# Patient Record
Sex: MALE | Race: WHITE | NOT HISPANIC OR LATINO
[De-identification: names, ages, dates, MRNs, and addresses within clinical notes are randomized per-mention and may not be internally consistent; named-entity substitution may affect disease eponyms.]

---

## 2019-12-28 ENCOUNTER — TRANSCRIPTION ENCOUNTER (OUTPATIENT)
Age: 24
End: 2019-12-28

## 2024-09-10 ENCOUNTER — NON-APPOINTMENT (OUTPATIENT)
Age: 29
End: 2024-09-10

## 2024-09-11 ENCOUNTER — NON-APPOINTMENT (OUTPATIENT)
Age: 29
End: 2024-09-11

## 2024-09-11 ENCOUNTER — APPOINTMENT (OUTPATIENT)
Dept: NEUROLOGY | Facility: CLINIC | Age: 29
End: 2024-09-11
Payer: COMMERCIAL

## 2024-09-11 VITALS
DIASTOLIC BLOOD PRESSURE: 83 MMHG | SYSTOLIC BLOOD PRESSURE: 117 MMHG | OXYGEN SATURATION: 96 % | HEART RATE: 56 BPM | TEMPERATURE: 96.7 F

## 2024-09-11 VITALS — HEIGHT: 70 IN | BODY MASS INDEX: 26.77 KG/M2 | WEIGHT: 187 LBS

## 2024-09-11 DIAGNOSIS — G43.E09 CHRONIC MIGRAINE WITH AURA, NOT INTRACTABLE, WITHOUT STATUS MIGRAINOSUS: ICD-10-CM

## 2024-09-11 PROCEDURE — 99204 OFFICE O/P NEW MOD 45 MIN: CPT

## 2024-09-11 RX ORDER — RIZATRIPTAN BENZOATE 10 MG/1
10 TABLET ORAL
Qty: 9 | Refills: 1 | Status: ACTIVE | COMMUNITY
Start: 2024-09-11 | End: 1900-01-01

## 2024-09-11 NOTE — DISCUSSION/SUMMARY
[FreeTextEntry1] : Impression: 1) Chronic migraine with visual aura - severe migraines every few months associated with bitemporal pain and hypersensitivities. Always preceded by visual aura with flashing lights  Plan: 1) Trial of Rizatriptan prn

## 2024-09-11 NOTE — HISTORY OF PRESENT ILLNESS
[FreeTextEntry1] : Oleg is a 29 year old male presenting with headaches.  Headache History: Onset and course: 3 years  Location: Bilateral temples Character: Sharp, splitting Severity: 10/10 Duration: 1-2 hours Hypersensitivity: Photophobia, phonophobia, nausea, vomiting  H/A days/month: Can go several months headache free, then will get several in a two week period Aura: Flashing lights for 10 minutes prior to pain  Autonomic symptoms: None  Alleviated by: Rest in dark room, time  Aggravations/Triggers: Unsure  Sleep/menses: Other medication/supplement trials: Tylenol, Advil (no relief); Ubrelvy sample from PCP (worked well)  Fam Hx of H/A: None  Imaging: None   Working as an  in civil law.

## 2024-09-11 NOTE — PHYSICAL EXAM
[FreeTextEntry1] : General: Constitutional: Sitting comfortably in NAD Psychiatric: well-groomed, appropriate affect, insight/judgement intact Ears, Nose, Throat: no abnormalities, mucus membranes moist Extremities: no edema, clubbing, or cyanosis Skin: no rash or neurocutaneous signs   Cognitive: Orientation, language, memory and knowledge screens intact Cranial Nerves: II: Full to confrontation; III/IV/VI: PERRL EOMI, no nystagmus [id number]: Symmetric. VII: Face appears symmetric. VIII: Normal to screening, IX/X: Palate elevates symmetrical. XI: Trapezius symmetric. XII: Tongue midline   Motor: Power: 5/5 throughout Tone: Normal x4 limbs Tremor: none   Sensation: intact to light touch   Coordination/Gait: Finger-nose-finger intact, normal rapid-alternating movements Fine motor normal with normal rapid finger taps and heel tapping Romberg negative